# Patient Record
Sex: MALE | Race: WHITE | NOT HISPANIC OR LATINO | Employment: OTHER | URBAN - METROPOLITAN AREA
[De-identification: names, ages, dates, MRNs, and addresses within clinical notes are randomized per-mention and may not be internally consistent; named-entity substitution may affect disease eponyms.]

---

## 2017-04-19 ENCOUNTER — OFFICE VISIT (OUTPATIENT)
Dept: CARDIOLOGY | Facility: CLINIC | Age: 82
End: 2017-04-19

## 2017-04-19 VITALS
BODY MASS INDEX: 19.08 KG/M2 | HEIGHT: 69 IN | WEIGHT: 128.8 LBS | HEART RATE: 74 BPM | SYSTOLIC BLOOD PRESSURE: 93 MMHG | DIASTOLIC BLOOD PRESSURE: 52 MMHG

## 2017-04-19 DIAGNOSIS — E78.2 MIXED HYPERLIPIDEMIA: ICD-10-CM

## 2017-04-19 DIAGNOSIS — I10 ESSENTIAL HYPERTENSION: ICD-10-CM

## 2017-04-19 DIAGNOSIS — I25.9 ISCHEMIC HEART DISEASE: Primary | ICD-10-CM

## 2017-04-19 PROCEDURE — 99213 OFFICE O/P EST LOW 20 MIN: CPT | Performed by: INTERNAL MEDICINE

## 2017-04-19 RX ORDER — CHLORAL HYDRATE 500 MG
1000 CAPSULE ORAL
COMMUNITY

## 2017-04-19 RX ORDER — DONEPEZIL HYDROCHLORIDE 10 MG/1
TABLET, FILM COATED ORAL
Refills: 0 | COMMUNITY
Start: 2017-03-13

## 2017-04-19 RX ORDER — OMEPRAZOLE 20 MG/1
20 CAPSULE, DELAYED RELEASE ORAL DAILY
COMMUNITY

## 2017-04-19 RX ORDER — SIMVASTATIN 20 MG
20 TABLET ORAL NIGHTLY
COMMUNITY

## 2017-04-19 RX ORDER — ATENOLOL 100 MG/1
100 TABLET ORAL DAILY
COMMUNITY
End: 2017-04-19 | Stop reason: ALTCHOICE

## 2017-04-19 RX ORDER — ALFUZOSIN HYDROCHLORIDE 10 MG/1
10 TABLET, EXTENDED RELEASE ORAL DAILY
COMMUNITY

## 2017-04-19 RX ORDER — CARVEDILOL 12.5 MG/1
12.5 TABLET ORAL 2 TIMES DAILY
Qty: 180 TABLET | Refills: 3 | Status: SHIPPED | OUTPATIENT
Start: 2017-04-19

## 2017-04-19 RX ORDER — ASPIRIN 325 MG
325 TABLET ORAL DAILY
COMMUNITY

## 2017-04-19 RX ORDER — LISINOPRIL 10 MG/1
20 TABLET ORAL DAILY
COMMUNITY

## 2017-04-19 NOTE — PROGRESS NOTES
Subjective:     Encounter Date:04/19/2017      Patient ID: Leonard Matute is an 85 y.o.  white male from [place], [place].     [facility] CARDIOLOGIST: RUPA Montalvo MD, [facility]  CARDIOTHORACIC SURGEON: Arielle Fine MD  PHYSICIAN:  Ari Small MD  UROLOGIST:  Ky Da Silva Jr., MD    Chief Complaint:   Chief Complaint   Patient presents with   • Follow-up     htn     Problem List:  1. Ischemic heart disease:  a. Abnormal stress test, January 1996, Dr. Palmer Astorga: [facility] recommendation; patient deferred at time (data deficit).   b. [facility], 01/12/2010, Dr. Jacky Montalvo, with 3-vessel coronary artery disease, preserved LVEF.   c. CABG x5, 01/14/2010, Dr. Fine (YA to LAD; SVG to diagonal branch; SVG to OMB and left internal branch of RCA; sequential SVG to PDA).  d. Residual class I symptoms  2. Hypertension.   3. Hyperlipidemia.   4. Diabetes mellitus type 2, uncontrolled.   5. GERD.   6. Arthritis.   7. Bilateral cataracts.   8. Elevated PSA, followed by Dr. Da Silva.  9. Surgical history:  a. Appendectomy.   b. Hernia surgeries x2.  c. CABG x 5 vessels    Allergies   Allergen Reactions   • Contrast Dye      Hives, syncope   • Ramipril Dizziness         Current Outpatient Prescriptions:   •  alfuzosin (UROXATRAL) 10 MG 24 hr tablet, Take 10 mg by mouth Daily., Disp: , Rfl:   •  aspirin 325 MG tablet, Take 325 mg by mouth Daily., Disp: , Rfl:   •  atenolol (TENORMIN) 100 MG tablet, Take 100 mg by mouth Daily., Disp: , Rfl:   •  donepezil (ARICEPT) 10 MG tablet, TAKE 1 TABLET BY MOUTH DAILY AT BEDTIME, Disp: , Rfl: 0  •  GLIPIZIDE PO, Take  by mouth Daily., Disp: , Rfl:   •  lisinopril (PRINIVIL,ZESTRIL) 10 MG tablet, Take 20 mg by mouth Daily., Disp: , Rfl:   •  metFORMIN (GLUCOPHAGE) 500 MG tablet, 2 (Two) Times a Day., Disp: , Rfl: 1  •  Omega-3 Fatty Acids (FISH OIL) 1000 MG capsule capsule, Take 1,000 mg by mouth Daily With Breakfast., Disp: , Rfl:   •  omeprazole (priLOSEC) 20 MG capsule, Take 20 mg  "by mouth Daily., Disp: , Rfl:   •  simvastatin (ZOCOR) 20 MG tablet, Take 20 mg by mouth Every Night., Disp: , Rfl:     History of Present Illness Patient returns for scheduled annual followup.  Patient denies any chest pain, shortness of breath, palpitations, edema, or presyncope.  He has had a couple of syncopal episodes after having hyperglycemia and taken to the local hospital.  At that time his blood sugars are running around 400.  Lately at home they have been running around 200-225.  His physician is adjusting his medications currently.  He has loss of appetite and has lost 14 pounds in the past year. He denies tobacco or NTG use and is fairly sedentary and inactive, according to his wife who accompanies him to the office and provides most of the history.      Review of Systems   All other systems reviewed and are negative.     Obtained and otherwise negative except as outlined in problem list and HPI.    Procedures       Objective:       Vitals:    04/19/17 1319 04/19/17 1321   BP: 97/51 93/52   BP Location: Left arm Left arm   Patient Position: Sitting Standing   Pulse: 67 74   Weight: 128 lb 12.8 oz (58.4 kg)    Height: 69\" (175.3 cm)      Body mass index is 19.02 kg/(m^2).   Last weight:  142 lbs.    Physical Exam   Constitutional: He is oriented to person, place, and time. He appears well-developed and well-nourished.   Neck: No JVD present. Carotid bruit is not present. No thyromegaly present.   Cardiovascular: Regular rhythm, S1 normal and S2 normal.  Exam reveals no gallop, no S3 and no friction rub.    Murmur heard.   Medium-pitched early systolic murmur is present with a grade of 1/6  at the lower left sternal border  Pulses:       Carotid pulses are 1+ on the right side, and 1+ on the left side.       Radial pulses are 1+ on the right side, and 1+ on the left side.        Femoral pulses are 1+ on the right side, and 1+ on the left side.       Popliteal pulses are 1+ on the right side, and 1+ on the " left side.        Dorsalis pedis pulses are 1+ on the right side, and 1+ on the left side.        Posterior tibial pulses are 1+ on the right side, and 1+ on the left side.   Pulmonary/Chest: Effort normal. He has decreased breath sounds. He has no wheezes. He has no rhonchi. He has no rales.   Abdominal: Soft. He exhibits no mass. There is no hepatosplenomegaly. There is no tenderness. There is no guarding.   Lymphadenopathy:     He has no cervical adenopathy.   Neurological: He is alert and oriented to person, place, and time.   Skin: Skin is warm, dry and intact. No rash noted.   Vitals reviewed.      Lab Review: No recent laboratory studies available for review.        Assessment:   Overall continued acceptable course with no interim cardiopulmonary complaints with acceptable functional status. We will defer additional diagnostic or therapeutic intervention from a cardiac perspective at this time.  Patient had apparent intermittent syncope after having hyperglycemia and taken to the local hospital.  Lately his blood glucose levels have been running around 200-225.  His physician is adjusting his medications.  Patient has lost 14 pounds over the past year, and had blood pressures systolic running in the 90s.  We will discontinue his atenolol due to its tendency to increase hyperglycemia.  We will begin Coreg 12.5 mg twice a day. We have no assessment of his lipid profile or hemoglobin A1c at this time and have minimal data overall to review over the past year.  He does not appear to have recurrent angina pectoris, CHF, uncontrolled hypertension, or important arrhythmia at this time.     Diagnosis Plan   1. Ischemic heart disease     2. Essential hypertension     3. Mixed hyperlipidemia            Plan:         1. Patient to continue current medications and close follow up with the above providers.  2. Tentative cardiology follow up in 8 months in [place] office or patient may return sooner PRN.  3. Discontinue  atenolol   4. Coreg 12.5 mg twice a day      Scribed for Gregory Pantoja MD by Shannan Hyman, LEBRON. 4/19/2017  2:08 PM      I, Gregory Pantoja MD, [facility], personally performed the services described in this documentation as scribed by the above named individual in my presence, and it is both accurate and complete. At 4:01 PM on 04/19/2017

## 2017-12-20 ENCOUNTER — OFFICE VISIT (OUTPATIENT)
Dept: CARDIOLOGY | Facility: CLINIC | Age: 82
End: 2017-12-20

## 2017-12-20 VITALS
WEIGHT: 128 LBS | SYSTOLIC BLOOD PRESSURE: 134 MMHG | HEART RATE: 94 BPM | HEIGHT: 69 IN | BODY MASS INDEX: 18.96 KG/M2 | DIASTOLIC BLOOD PRESSURE: 87 MMHG

## 2017-12-20 DIAGNOSIS — E11.8 TYPE 2 DIABETES MELLITUS WITH COMPLICATION, WITHOUT LONG-TERM CURRENT USE OF INSULIN (HCC): ICD-10-CM

## 2017-12-20 DIAGNOSIS — I25.9 ISCHEMIC HEART DISEASE: Primary | ICD-10-CM

## 2017-12-20 DIAGNOSIS — E78.5 DYSLIPIDEMIA: ICD-10-CM

## 2017-12-20 PROCEDURE — 99214 OFFICE O/P EST MOD 30 MIN: CPT | Performed by: INTERNAL MEDICINE

## 2017-12-20 RX ORDER — DICYCLOMINE HYDROCHLORIDE 10 MG/1
10 CAPSULE ORAL
COMMUNITY

## 2017-12-20 NOTE — PROGRESS NOTES
Subjective:     Encounter Date:12/20/2017    Patient ID: Leonard Matute is an 86 y.o.  white male from [place], [place].      [facility] CARDIOLOGIST: RUPA Montalvo MD, [facility]  CARDIOTHORACIC SURGEON: Arielle Fine MD  PHYSICIAN:  Ari Small MD  UROLOGIST:  Ky Da Silva Jr., MD  NEUROSURGEON:  MD Ayala    Chief Complaint:   Chief Complaint   Patient presents with   • ischemic heart disease     Problem List:  1. Ischemic heart disease:  a. Abnormal stress test, January 1996, Dr. Palmer Astorga: [facility] recommendation; patient deferred at time (data deficit).   b. [facility], 01/12/2010, Dr. Jacky Montalvo, with 3-vessel coronary artery disease, preserved LVEF.   c. CABG x5, 01/14/2010, Dr. Fine (YA to LAD; SVG to diagonal branch; SVG to OMB and left internal branch of RCA; sequential SVG to PDA).  d. Residual class I symptoms on limited activity  2. Hypertension.   3. Hyperlipidemia.   4. Diabetes mellitus type 2, uncontrolled; hemoglobin A1c 9.1%, September 2016; 7.3%, March 2017.  5. GERD.   6. Arthritis.   7. Bilateral cataracts.   8. Elevated PSA, followed by Dr. Da Silva.  9. Stage 3 chronic kidney disease.  10. Recent fall with brain contusion and possible subdural hematoma with drain and neurosurgical intervention and treatment - data deficit, [facility], September 2017, with subsequent repeat head CT scans x2 - data deficit.  11. Surgical history:  a. Appendectomy.   b. Hernia surgeries x2.  c. CABG x 5 vessels    Allergies   Allergen Reactions   • Contrast Dye      Hives, syncope   • Ramipril Dizziness         Current Outpatient Prescriptions:   •  alfuzosin (UROXATRAL) 10 MG 24 hr tablet, Take 10 mg by mouth Daily., Disp: , Rfl:   •  aspirin 325 MG tablet, Take 325 mg by mouth Daily., Disp: , Rfl:   •  carvedilol (COREG) 12.5 MG tablet, Take 1 tablet by mouth 2 (Two) Times a Day., Disp: 180 tablet, Rfl: 3  •  dicyclomine (BENTYL) 10 MG capsule, Take 10 mg by mouth 4  "(Four) Times a Day Before Meals & at Bedtime., Disp: , Rfl:   •  donepezil (ARICEPT) 10 MG tablet, TAKE 1 TABLET BY MOUTH DAILY AT BEDTIME, Disp: , Rfl: 0  •  lisinopril (PRINIVIL,ZESTRIL) 10 MG tablet, Take 20 mg by mouth Daily., Disp: , Rfl:   •  metFORMIN (GLUCOPHAGE) 500 MG tablet, 2 (Two) Times a Day., Disp: , Rfl: 1  •  Omega-3 Fatty Acids (FISH OIL) 1000 MG capsule capsule, Take 1,000 mg by mouth Daily With Breakfast., Disp: , Rfl:   •  omeprazole (priLOSEC) 20 MG capsule, Take 20 mg by mouth Daily., Disp: , Rfl:   •  simvastatin (ZOCOR) 20 MG tablet, Take 20 mg by mouth Every Night., Disp: , Rfl:   •  SITagliptin (JANUVIA) 100 MG tablet, Take 100 mg by mouth Daily., Disp: , Rfl:   •  GLIPIZIDE PO, Take  by mouth Daily., Disp: , Rfl:     HISTORY OF PRESENT ILLNESS: Patient returns for scheduled 8-month followup.  He is accompanied today by his wife and his son (Volodymyr).  The patient states that he fell out of a recliner in September 2017 when he lost his balance, and he hit a candle stand with a plate on it, and the \"glass went into my brain.\"  His son says that they did a scan on him while he was in the hospital, and they saw a \"brain bleed\" as a result of his injury, but there was nothing in his brain.  They put a drain in, and his son says that he was in the hospital for 4 days, and then they took the drain out.  He has had followup CAT scans done, with the last one being done about a week ago, and the doctor is to read it on 12/28/2017.   The patient says that \"I'm so dizzy that I can't get along.\"  His son states that he had been having problems with dizziness even prior to his fall.  His son does not recall ever seeing anyone check his blood pressure standing up.  Heart-wise, he has been doing okay, and his wife says that he will \"sneak of a night and pilfer the candy Kisses a little too much.\"  The patient says that they changed his medication, and his son says that his dose of Coreg has been altered to " "\"about 3.25\" and that his \"numbers have been better\" since that time.  The patient says that he does not have any chest pain.  He has gotten a flu shot and a pneumonia shot.  His wife says that he did not do much activity this summer because \"he just didn't feel like it.\"  His son says that it seems like he just \"runs out of air,\" but he believes his chest x-ray looked okay when he was in the hospital.  His son says that he did physical therapy for about 6 weeks but was dismissed from that.  Patient otherwise denies chest pain, shortness of breath, PND, edema, palpitations, syncope or presyncope at this time on limited activity.        Review of Systems   Constitution: Positive for decreased appetite, weakness, malaise/fatigue and weight loss.   HENT: Positive for tinnitus.    Eyes: Positive for blurred vision, double vision and visual halos.   Respiratory: Positive for shortness of breath and sputum production.    Hematologic/Lymphatic: Bruises/bleeds easily.   Musculoskeletal: Positive for falls.   Gastrointestinal: Positive for abdominal pain.   Genitourinary: Positive for bladder incontinence.   Neurological: Positive for excessive daytime sleepiness, dizziness and loss of balance.   Psychiatric/Behavioral: The patient is nervous/anxious.         Agitation, confusion      Obtained and otherwise negative except as outlined in problem list and HPI.    Procedures       Objective:       Vitals:    12/20/17 0950 12/20/17 0953   BP: 135/69 134/87   BP Location: Right arm Right arm   Patient Position: Sitting Standing   Pulse: 78 94   Weight: 58.1 kg (128 lb)    Height: 175.3 cm (69\")      Body mass index is 18.9 kg/(m^2).   Last weight:  128 lbs.    Physical Exam   Constitutional: He is oriented to person, place, and time. He appears well-developed and well-nourished.   Neck: No JVD present. Carotid bruit is not present. No thyromegaly present.   Cardiovascular: Regular rhythm, S1 normal and S2 normal.  Exam reveals " distant heart sounds. Exam reveals no gallop, no S3 and no friction rub.    Murmur heard.   Medium-pitched early systolic murmur is present with a grade of 2/6  at the upper right sternal border  Pulses:       Carotid pulses are 1+ on the right side, and 1+ on the left side.       Radial pulses are 1+ on the right side, and 1+ on the left side.        Femoral pulses are 1+ on the right side, and 1+ on the left side.       Popliteal pulses are 1+ on the right side, and 1+ on the left side.        Dorsalis pedis pulses are 1+ on the right side, and 1+ on the left side.        Posterior tibial pulses are 1+ on the right side, and 1+ on the left side.   Pulmonary/Chest: Effort normal. He has decreased breath sounds. He has no wheezes. He has no rhonchi. He has no rales.   Abdominal: Soft. He exhibits no mass. There is no hepatosplenomegaly. There is no tenderness. There is no guarding.   Bowel sounds audible x4   Musculoskeletal: Normal range of motion. He exhibits no edema.   Lymphadenopathy:     He has no cervical adenopathy.   Neurological: He is alert and oriented to person, place, and time.   Skin: Skin is warm, dry and intact. No rash noted.   Vitals reviewed.        Lab Review:   March 2017 (reviewed with patient by letter)  · FLP - total cholesterol 150, triglycerides 97, HDL-C 39, LDL-C 92  · Hemoglobin A1c - 7.3%  · CMP - normal electrolytes with acceptable potassium of 5.6 with serum creatinine of 1.3 and BUN of 31, with glucose of 206          Assessment:   Overall continued acceptable course with no interim cardiopulmonary complaints with marginal functional status. We will defer additional diagnostic or therapeutic intervention from a cardiac perspective at this time.  He seems to be very aged and frail at this time and has been cautioned to use his quad cane and rolling walker to avoid further falls.  He does not appear to have angina pectoris, CHF, or uncontrolled hypertension at this time.  We have no  data to review over the past 8 months and will attempt to participate in his care as allowed.       Diagnosis Plan   1. Ischemic heart disease  No recurrent angina pectoris or CHF on limited activity   2. Type 2 diabetes mellitus with complication, without long-term current use of insulin  No current data to review   3. Dyslipidemia  No current data to review          Plan:         1. Patient to continue current medications and close follow up with the above providers.  2. Tentative cardiology follow up in May 2018 in our [place] office, or patient may return sooner PRN.       Transcribed by Bela Hunt for Dr. Gregory Pantoja at 10:00 AM on 12/20/2017      I, Gregory Pantoja MD, [facility], personally performed the services described in this documentation as scribed by the above named individual in my presence, and it is both accurate and complete. At 10:38 AM on 12/20/2017

## 2018-08-20 ENCOUNTER — TELEPHONE (OUTPATIENT)
Dept: CARDIOLOGY | Facility: CLINIC | Age: 83
End: 2018-08-20

## 2018-08-20 NOTE — TELEPHONE ENCOUNTER
Patients wife called regarding patient having chest pain and wanted to move his appointment up.  She stated that he is in rehab and they will not listen to the complaints.  She left a message that I could call her back after dark because she spends the day at rehab with him.  I tried returning to her call but there was no answer and the voicemail would not let me leave a message.  I will try to call in the again in the am.  She also said she had a hard time getting messages off her machine.